# Patient Record
Sex: FEMALE | Race: WHITE | NOT HISPANIC OR LATINO | Employment: OTHER | URBAN - METROPOLITAN AREA
[De-identification: names, ages, dates, MRNs, and addresses within clinical notes are randomized per-mention and may not be internally consistent; named-entity substitution may affect disease eponyms.]

---

## 2017-12-29 ENCOUNTER — OFFICE VISIT (OUTPATIENT)
Dept: URGENT CARE | Facility: CLINIC | Age: 32
End: 2017-12-29
Payer: COMMERCIAL

## 2017-12-29 DIAGNOSIS — T14.8XXA OTHER INJURY OF UNSPECIFIED BODY REGION, INITIAL ENCOUNTER (CODE): ICD-10-CM

## 2017-12-29 PROCEDURE — G0382 LEV 3 HOSP TYPE B ED VISIT: HCPCS

## 2017-12-29 PROCEDURE — 99283 EMERGENCY DEPT VISIT LOW MDM: CPT

## 2018-01-01 NOTE — PROGRESS NOTES
Assessment   1  Muscle strain (848 9) (T14 8XXA)    Plan   Muscle strain    · Baclofen 10 MG Oral Tablet; TAKE 1 TABLET 3 TIMES DAILY AS NEEDED FOR    MUSCLE SPASM   · Naproxen 500 MG Oral Tablet; TAKE 1 TABLET EVERY 12 HOURS AS NEEDED   · *1 - [facility]  *  Status: Active  Requested for: [id number]  Care Summary provided  : Yes   · Orthopedic Surgery Referral Other Co-Management  *  Status: Hold For - Scheduling     Requested for: [id number]  are Referring to a non- Preferred Provider : Established Patient  Care Summary provided  : Yes    Discussion/Summary   Discussion Summary:    1) alternate ice and heat 10-20 minutes at a time at least 4 times a day gentle stretching and massage 1000mg tylenol four times a day as needed for pain take naproxen for inflammation and pain  take baclofen for muscle spasm      Chief Complaint   1  Back Pain  Chief Complaint Free Text Note Form: Pt c/o back pain that starts under her right shoulder blade and goes down her lower back for a week  Pt reports no injury  History of Present Illness   HPI: 24-year-old female presents with upper back pain that radiates right shoulder blade x1 week  Patient states she has been applying ice and heat and taking ibuprofen and Aleve without relief of symptoms  Patient states she needs pain to go away so she can go back to work  Patient states pain feels like a burning sensation and is exacerbated with movement  Patient denies injury, impaired range of motion, weakness, midline pain  Hospital Based Practices Required Assessment:      Abuse And Domestic Violence Screen       Yes, the patient is safe at home  -- The patient states no one is hurting them  Depression And Suicide Screen  No, the patient has not had thoughts of hurting themself  No, the patient has not felt depressed in the past 7 days  Prefered Language is  [place]  Primary Language is  English  Active Problems   1   Anxiety disorder (300 00) (F41 9)   2  Asthma (493 90) (J45 909)   3  Carpal tunnel syndrome (354 0) (G56 00)   4  Nicotine dependence (305 1) (F17 200)   5  Seasonal allergies (477 9) (J30 2)   6  Vitamin D deficiency (268 9) (E55 9)   7  Weight gain (783 1) (R63 5)    Past Medical History   Active Problems And Past Medical History Reviewed: The active problems and past medical history were reviewed and updated today  Family History   Mother    1  Family history of cardiac disorder (V17 49) (Z82 49)   2  Family history of diabetes mellitus (V18 0) (Z83 3)   3  Family history of hypothyroidism (V18 19) (Z83 49)  Father    4  Family history of cardiac disorder (V17 49) (Z82 49)   5  Family history of diabetes mellitus (V18 0) (Z83 3)  Grandparent    6  Family history of diabetes mellitus (V18 0) (Z83 3)  Family History Reviewed: The family history was reviewed and updated today  Social History    · Current every day smoker (305 1) (F17 200)   · Three children  Social History Reviewed: The social history was reviewed and is unchanged  Surgical History   1  History of Appendectomy   2  History of Tubal Ligation  Surgical History Reviewed: The surgical history was reviewed and updated today  Current Meds    1  Vitamin D (Ergocalciferol) 25836 UNIT Oral Capsule; TAKE 1 CAPSULE WEEKLY; Therapy: [id number] to (Last Rx:21Apr2016)  Requested for: 21Apr2016 Ordered  Medication List Reviewed: The medication list was reviewed and updated today  Allergies   1  No Known Drug Allergies  2  No Known Environmental Allergies   3  No Known Food Allergies    Vitals   Signs   Recorded: 96RMG7708 11:26AM   Temperature: 97 3 F  Heart Rate: 58  Respiration: 18  Systolic: 384  Diastolic: 60  Weight: 662 lb 9 06 oz  BMI Calculated: 23 61  BSA Calculated: 1 67  O2 Saturation: 100    Physical Exam        Constitutional      General appearance: No acute distress, well appearing and well nourished         Ears, Nose, Mouth, and Throat      External inspection of ears and nose: Normal        Pulmonary      Respiratory effort: No increased work of breathing or signs of respiratory distress  Auscultation of lungs: Clear to auscultation  no rales or crackles were heard bilaterally  no rhonchi  no friction rub  no wheezing  no diminished breath sounds  Cardiovascular      Palpation of heart: Normal PMI, no thrills  Auscultation of heart: Normal rate and rhythm, normal S1 and S2, without murmurs  Abdomen      Abdomen: Non-tender, no masses  Musculoskeletal      Gait and station: Normal        Digits and nails: Normal without clubbing or cyanosis  Inspection/palpation of joints, bones, and muscles: Abnormal  -- ROM of trunk, R arm, and neck intact; mild TTP over R thoracic Paraspinal muscles  Skin      Skin and subcutaneous tissue: Normal without rashes or lesions  Neurologic      Cranial nerves: Cranial nerves 2-12 intact  Reflexes: 2+ and symmetric  Sensation: No sensory loss  -- pt vascularly and neurologically intact        Psychiatric      Orientation to person, place, and time: Normal        Mood and affect: Normal        Signatures    Electronically signed by : JORDIN Sauceda; Dec 29 2017 12:03PM EST                       (Author)     Electronically signed by : EMILY Chand ; Dec 31 2017  2:50PM EST                       (Co-author)

## 2018-01-09 ENCOUNTER — OFFICE VISIT (OUTPATIENT)
Dept: URGENT CARE | Facility: CLINIC | Age: 33
End: 2018-01-09
Payer: COMMERCIAL

## 2018-01-09 PROCEDURE — 99283 EMERGENCY DEPT VISIT LOW MDM: CPT

## 2018-01-09 PROCEDURE — G0382 LEV 3 HOSP TYPE B ED VISIT: HCPCS

## 2018-01-10 NOTE — PROGRESS NOTES
Assessment   1  Cervical radicular pain (723 4) (M54 12)    Plan   Cervical radicular pain    · Cyclobenzaprine HCl - 10 MG Oral Tablet; TAKE 1 TABLET 3 TIMES DAILY AS    NEEDED   · PredniSONE 10 MG Oral Tablet; 3 tabs BID X 2 days, 2 Tabs BID X 2 days, 1 Tab    BID X 2 Days, then 1 Tab daily X 2 days  Muscle strain    · Baclofen 10 MG Oral Tablet   · Naproxen 500 MG Oral Tablet    Discussion/Summary   Discussion Summary:    Will start prednisone and cyclobenzaprine to see if this gives her more relief  Patient likely needs MRI  Informed to follow up with PCP to get further testing and evaluation  Medication Side Effects Reviewed: Possible side effects of new medications were reviewed with the patient/guardian today  Understands and agrees with treatment plan: The treatment plan was reviewed with the patient/guardian  The patient/guardian understands and agrees with the treatment plan      Chief Complaint   1  Back Pain  Chief Complaint Free Text Note Form: P/s pain is worsening and now radiating across her back  Sometimes the medication ordered helps relieve, but not improving      History of Present Illness   HPI: 75-year-old female here with complaint of right shoulder pain and right-sided neck pain  Pain radiates across her back and down  Is affecting her right upper extremity and she notes weakness in her right arm  Sharp stabbing burning pain in her right upper back with associated numbness and tingling  Has been taking naproxen, Tylenol and baclofen with minimal relief  Denies any known injury  Review of Systems   Focused-Female:      Constitutional: feeling poorly, but-- no fever-- and-- no chills  ENT: no ear ache, no loss of hearing, no nosebleeds or nasal discharge, no sore throat or hoarseness  Cardiovascular: no complaints of slow or fast heart rate, no chest pain, no palpitations, no leg claudication or lower extremity edema        Respiratory: no complaints of shortness of breath, no wheezing, no dyspnea on exertion, no orthopnea or PND  Musculoskeletal: as noted in HPI  Neurological: numbness-- and-- tingling, but-- as noted in HPI  ROS Reviewed:    ROS reviewed  Active Problems   1  Anxiety disorder (300 00) (F41 9)   2  Asthma (493 90) (J45 909)   3  Carpal tunnel syndrome (354 0) (G56 00)   4  Muscle strain (848 9) (T14 8XXA)   5  Nicotine dependence (305 1) (F17 200)   6  Seasonal allergies (477 9) (J30 2)   7  Vitamin D deficiency (268 9) (E55 9)   8  Weight gain (783 1) (R63 5)    Past Medical History   Active Problems And Past Medical History Reviewed: The active problems and past medical history were reviewed and updated today  Family History   Mother    1  Family history of cardiac disorder (V17 49) (Z82 49)   2  Family history of diabetes mellitus (V18 0) (Z83 3)   3  Family history of hypothyroidism (V18 19) (Z83 49)  Father    4  Family history of cardiac disorder (V17 49) (Z82 49)   5  Family history of diabetes mellitus (V18 0) (Z83 3)  Grandparent    6  Family history of diabetes mellitus (V18 0) (Z83 3)  Family History Reviewed: The family history was reviewed and updated today  Social History    · Current every day smoker (305 1) (F17 200)   · Three children  Social History Reviewed: The social history was reviewed and updated today  The social history was reviewed and is unchanged  Surgical History   1  History of Appendectomy   2  History of Tubal Ligation  Surgical History Reviewed: The surgical history was reviewed and updated today  Current Meds    1  Baclofen 10 MG Oral Tablet; TAKE 1 TABLET 3 TIMES DAILY AS NEEDED FOR MUSCLE     SPASM; Therapy: 15IUO5765 to ()  Requested for: 36Jxw8107; Last     Rx:[id number] Ordered   2  Naproxen 500 MG Oral Tablet; TAKE 1 TABLET EVERY 12 HOURS AS NEEDED; Therapy: 68KGG4661 to (Evaluate:06Aik5541)  Requested for: 12Xmm5750; Last     Rx:[id number] Ordered   3  Vitamin D (Ergocalciferol) 50377 UNIT Oral Capsule; TAKE 1 CAPSULE WEEKLY; Therapy: [id number] to (Last Rx:21Apr2016)  Requested for: 21Apr2016 Ordered  Medication List Reviewed: The medication list was reviewed and updated today  Allergies   1  No Known Drug Allergies  2  No Known Environmental Allergies   3  No Known Food Allergies    Vitals   Signs   Recorded: 62ZXP7741 08:56AM   Temperature: 97 3 F  Heart Rate: 67  Respiration: 17  Systolic: 439  Diastolic: 76  O2 Saturation: 100    Physical Exam        Constitutional      General appearance: No acute distress, well appearing and well nourished  Pulmonary      Respiratory effort: No increased work of breathing or signs of respiratory distress  Auscultation of lungs: Clear to auscultation  Cardiovascular      Auscultation of heart: Normal rate and rhythm, normal S1 and S2, without murmurs  Musculoskeletal      Gait and station: Normal  -- cervical spine with limited ROM to the left  Palpable muscle spasm right side of neck and shoulder  tender to palpation  Right upper extremity strength decreased 4/5, compared to left upper extremity  Sensation intact grossly        Signatures    Electronically signed by : USHA Haas; Jan 9 2018  9:25AM EST                       (Author)     Electronically signed by : EMILY Staley ; Jan 9 2018  9:37AM EST                       (Co-author)

## 2018-01-23 VITALS
WEIGHT: 137.57 LBS | BODY MASS INDEX: 23.61 KG/M2 | TEMPERATURE: 97.3 F | OXYGEN SATURATION: 100 % | RESPIRATION RATE: 18 BRPM | HEART RATE: 58 BPM | SYSTOLIC BLOOD PRESSURE: 117 MMHG | DIASTOLIC BLOOD PRESSURE: 60 MMHG

## 2018-01-23 VITALS
DIASTOLIC BLOOD PRESSURE: 76 MMHG | SYSTOLIC BLOOD PRESSURE: 114 MMHG | TEMPERATURE: 97.3 F | OXYGEN SATURATION: 100 % | HEART RATE: 67 BPM | RESPIRATION RATE: 17 BRPM